# Patient Record
Sex: FEMALE | Race: WHITE | Employment: FULL TIME | URBAN - METROPOLITAN AREA
[De-identification: names, ages, dates, MRNs, and addresses within clinical notes are randomized per-mention and may not be internally consistent; named-entity substitution may affect disease eponyms.]

---

## 2023-08-17 ENCOUNTER — ANESTHESIA EVENT (OUTPATIENT)
Facility: HOSPITAL | Age: 58
End: 2023-08-17
Payer: COMMERCIAL

## 2023-08-18 ENCOUNTER — HOSPITAL ENCOUNTER (OUTPATIENT)
Facility: HOSPITAL | Age: 58
Setting detail: OUTPATIENT SURGERY
Discharge: HOME | End: 2023-08-18
Attending: SURGERY | Admitting: SURGERY
Payer: COMMERCIAL

## 2023-08-18 ENCOUNTER — ANESTHESIA (OUTPATIENT)
Facility: HOSPITAL | Age: 58
End: 2023-08-18
Payer: COMMERCIAL

## 2023-08-18 VITALS
WEIGHT: 198.3 LBS | SYSTOLIC BLOOD PRESSURE: 126 MMHG | RESPIRATION RATE: 16 BRPM | HEIGHT: 63 IN | HEART RATE: 77 BPM | DIASTOLIC BLOOD PRESSURE: 76 MMHG | TEMPERATURE: 97.5 F | BODY MASS INDEX: 35.14 KG/M2 | OXYGEN SATURATION: 100 %

## 2023-08-18 LAB — GLUCOSE BLD STRIP.AUTO-MCNC: 138 MG/DL (ref 70–110)

## 2023-08-18 PROCEDURE — 3700000000 HC ANESTHESIA ATTENDED CARE: Performed by: SURGERY

## 2023-08-18 PROCEDURE — 3600007502: Performed by: SURGERY

## 2023-08-18 PROCEDURE — 82962 GLUCOSE BLOOD TEST: CPT

## 2023-08-18 PROCEDURE — 2709999900 HC NON-CHARGEABLE SUPPLY: Performed by: SURGERY

## 2023-08-18 PROCEDURE — 3600007512: Performed by: SURGERY

## 2023-08-18 PROCEDURE — 7100000010 HC PHASE II RECOVERY - FIRST 15 MIN: Performed by: SURGERY

## 2023-08-18 PROCEDURE — 3700000001 HC ADD 15 MINUTES (ANESTHESIA): Performed by: SURGERY

## 2023-08-18 PROCEDURE — 2580000003 HC RX 258: Performed by: ANESTHESIOLOGY

## 2023-08-18 PROCEDURE — 6360000002 HC RX W HCPCS: Performed by: NURSE ANESTHETIST, CERTIFIED REGISTERED

## 2023-08-18 PROCEDURE — 2500000003 HC RX 250 WO HCPCS: Performed by: NURSE ANESTHETIST, CERTIFIED REGISTERED

## 2023-08-18 RX ORDER — LISINOPRIL 10 MG/1
TABLET ORAL
COMMUNITY
Start: 2012-02-22

## 2023-08-18 RX ORDER — METOPROLOL SUCCINATE 50 MG/1
1 TABLET, EXTENDED RELEASE ORAL 2 TIMES DAILY
COMMUNITY
Start: 2022-01-24

## 2023-08-18 RX ORDER — LIDOCAINE HYDROCHLORIDE 20 MG/ML
INJECTION, SOLUTION EPIDURAL; INFILTRATION; INTRACAUDAL; PERINEURAL PRN
Status: DISCONTINUED | OUTPATIENT
Start: 2023-08-18 | End: 2023-08-18 | Stop reason: SDUPTHER

## 2023-08-18 RX ORDER — PROPOFOL 10 MG/ML
INJECTION, EMULSION INTRAVENOUS PRN
Status: DISCONTINUED | OUTPATIENT
Start: 2023-08-18 | End: 2023-08-18 | Stop reason: SDUPTHER

## 2023-08-18 RX ORDER — SODIUM CHLORIDE 0.9 % (FLUSH) 0.9 %
5-40 SYRINGE (ML) INJECTION EVERY 12 HOURS SCHEDULED
Status: DISCONTINUED | OUTPATIENT
Start: 2023-08-18 | End: 2023-08-18 | Stop reason: HOSPADM

## 2023-08-18 RX ORDER — SODIUM CHLORIDE 9 MG/ML
INJECTION, SOLUTION INTRAVENOUS PRN
Status: DISCONTINUED | OUTPATIENT
Start: 2023-08-18 | End: 2023-08-18 | Stop reason: HOSPADM

## 2023-08-18 RX ORDER — SODIUM CHLORIDE 0.9 % (FLUSH) 0.9 %
5-40 SYRINGE (ML) INJECTION PRN
Status: DISCONTINUED | OUTPATIENT
Start: 2023-08-18 | End: 2023-08-18 | Stop reason: HOSPADM

## 2023-08-18 RX ADMIN — PROPOFOL 50 MG: 10 INJECTION, EMULSION INTRAVENOUS at 10:27

## 2023-08-18 RX ADMIN — PROPOFOL 50 MG: 10 INJECTION, EMULSION INTRAVENOUS at 10:16

## 2023-08-18 RX ADMIN — PROPOFOL 50 MG: 10 INJECTION, EMULSION INTRAVENOUS at 10:18

## 2023-08-18 RX ADMIN — PROPOFOL 50 MG: 10 INJECTION, EMULSION INTRAVENOUS at 10:24

## 2023-08-18 RX ADMIN — PROPOFOL 50 MG: 10 INJECTION, EMULSION INTRAVENOUS at 10:20

## 2023-08-18 RX ADMIN — SODIUM CHLORIDE: 9 INJECTION, SOLUTION INTRAVENOUS at 08:45

## 2023-08-18 RX ADMIN — LIDOCAINE HYDROCHLORIDE 40 MG: 20 INJECTION, SOLUTION EPIDURAL; INFILTRATION; INTRACAUDAL; PERINEURAL at 10:16

## 2023-08-18 ASSESSMENT — PAIN - FUNCTIONAL ASSESSMENT: PAIN_FUNCTIONAL_ASSESSMENT: 0-10

## 2023-08-18 NOTE — DISCHARGE INSTRUCTIONS
Adilene Marysol  [id number]  1965    COLON DISCHARGE INSTRUCTIONS    Discomfort:  Redness at IV site- apply warm compress to area; if redness or soreness persist- contact your physician  There may be a slight amount of blood passed from the rectum  Gaseous discomfort- walking, belching will help relieve any discomfort  You should not operate a vehicle for 12 hours  You should not engage in an occupation involving machinery or appliances for rest of today  You may not drink alcoholic beverages for at least 12 hours  Avoid making any critical decisions for at least 24 hour  DIET:   Regular or resume diet normally appropriate for you. - however -  remember your colon is empty and a heavy meal will produce gas. Avoid these foods:  vegetables, fried / greasy foods, carbonated drinks for today     ACTIVITY:  You may resume your normal daily activities it is recommended that you spend the remainder of the day resting -  avoid any strenuous activity. CALL M.D. ANY SIGN OF:   Increasing pain, nausea, vomiting  Abdominal distension (swelling)  New increased bleeding (oral or rectal)  Fever (chills)  Pain in chest area  Bloody discharge from nose or mouth  Shortness of breath      Follow-up Instructions:   If biopsies were taken, please call Dr. Shun Morley office in next 2 weeks, [phone]. Otherwise follow up with your PCM. Adilene Marysol  [id number]  1965        DISCHARGE SUMMARY from Nurse    The following personal items collected during your admission are returned to you:   Dental Appliance:    Vision:    Hearing Aid:    Jewelry:    Clothing:    Other Valuables:    Valuables sent to safe: [facility]   DISCHARGE SUMMARY from Nurse    PATIENT INSTRUCTIONS:    After general anesthesia or intravenous sedation, for 24 hours or while taking prescription Narcotics:  Limit your activities  Do not drive and operate hazardous machinery  Do not make important personal or business discharge information has been reviewed with the patient and caregiver. The patient and caregiver verbalized understanding. Discharge medications reviewed with the patient and caregiver and appropriate educational materials and side effects teaching were provided.   ___________________________________________________________________________________________________________________________________    Patient armband removed and shredded

## 2023-08-18 NOTE — H&P
History of Colon Cancer. History of Present Illness  1. Colon Cancer Screen      Comments: family history of colon cancer - mom at age 48. Last colonoscopy in [facility] Dr. Brooks Landaverde. No blood thinners. 2 ovarian resections    2. Family History of Colon Cancer   family history of colon cancer - mom at age 48. Last colonoscopy in [facility] Dr. Brooks Landaverde. No blood thinners. 2 ovarian resections        Problem List  Problem Description  Onset Date  Chronic  Clinical Status  Notes  Hypertension    Y          Medications (active prior to today)  Medication  Instructions  Start Date  Stop Date  Refilled  Elsewhere  Vitamin D2 50,000 unit capsule  take 1 capsule by oral route  every week  04/03/2017      N  Crestor 5 mg tablet  take 1 tablet by oral route  every day  08/12/2022 08/12/2022  N  LISINOPRIL 40 MG TABLET  TAKE 1 TABLET BY MOUTH EVERY DAY  12/13/2022 12/13/2022  N  Farxiga 10 mg tablet  take 1 tablet by oral route  every day in the morning  12/16/2022 12/16/2022  N  METOPROLOL SUCC ER 50 MG TAB  TAKE 1 TABLET BY MOUTH TWICE A DAY  01/30/2023 01/30/2023  N  Fish Oil 1,000 mg (120 mg-180 mg) capsule    //      Y        Allergies  Ingredient  Reaction (Severity)  Medication Name  Comment  CODEINE  Unknown      HYDROCHLOROTHIAZIDE  Anaphylaxis          Review of Systems  System  Neg/Pos  Details  Constitutional  Negative  Chills, Fever, Night sweats and Weight loss. ENMT  Negative  Ear drainage and Hearing loss. Eyes  Negative  Eye pain and Vision changes. Respiratory  Negative  Cough, Dyspnea, Known TB exposure and Wheezing. Cardio  Negative  Chest pain and Claudication. GI  Negative  Abdominal pain, Blood in stool, Heartburn and Vomiting.   Negative  Dysuria and Hematuria. Endocrine  Negative  Cold intolerance and Heat intolerance. Neuro  Negative  Dizziness, Headache and Seizures. Psych  Negative  Insomnia and Impaired judgement.   Integumentary  Negative  Change in shape/size of mole(s), Hair

## 2023-08-18 NOTE — PERIOP NOTE
Dr Ruperto Patricia talked to patient's father and pt, all questions answered, pictures and procedure note given to pt. Discharge instructions given to pt's father and pt, verb. Understanding, all questions answered.

## 2023-08-18 NOTE — PERIOP NOTE
TRANSFER - IN REPORT:    Verbal report received from June RN on [address]  being received from procedure room for routine progression of patient care      Report consisted of patient's Situation, Background, Assessment and   Recommendations(SBAR). Information from the following report(s) Nurse Handoff Report, Intake/Output, MAR, Recent Results, and Med Rec Status was reviewed with the receiving nurse. Opportunity for questions and clarification was provided. Assessment completed upon patient's arrival to unit and care assumed.

## (undated) DEVICE — CATHETER IV 22GA L1IN BLU POLYUR STR HUB RADPQ PROTCT +

## (undated) DEVICE — SET ADMIN 16ML TBNG L100IN 2 Y INJ SITE IV PIGGY BK DISP (ORDER IN MULIPLES OF 48)

## (undated) DEVICE — SYRINGE 50ML E/T

## (undated) DEVICE — CATHETER PH SUCT 14FR

## (undated) DEVICE — Device

## (undated) DEVICE — KENDALL RADIOLUCENT FOAM MONITORING ELECTRODE RECTANGULAR SHAPE: Brand: KENDALL

## (undated) DEVICE — SYRINGE MED 5ML STD CLR PLAS LUERLOCK TIP N CTRL DISP

## (undated) DEVICE — CANNULA CUSH AD W/ 14FT TBG

## (undated) DEVICE — SINGLE PORT MANIFOLD: Brand: NEPTUNE 2

## (undated) DEVICE — SOLUTION IV 500ML 0.9% SOD CHL FLX CONT

## (undated) DEVICE — TOURNIQUET PHLEB W1XL18IN BLU FLAT RL AND BND REUSE FOR IV

## (undated) DEVICE — TUBING, SUCTION, 1/4" X 12', STRAIGHT: Brand: MEDLINE

## (undated) DEVICE — WRISTBAND ID AD W2.5XL9.5CM RED VYN ADH CLSR UNI-PRINT

## (undated) DEVICE — BLUNTFILL: Brand: MONOJECT